# Patient Record
Sex: FEMALE | Race: BLACK OR AFRICAN AMERICAN | NOT HISPANIC OR LATINO | Employment: FULL TIME | URBAN - METROPOLITAN AREA
[De-identification: names, ages, dates, MRNs, and addresses within clinical notes are randomized per-mention and may not be internally consistent; named-entity substitution may affect disease eponyms.]

---

## 2021-09-28 ENCOUNTER — TELEPHONE (OUTPATIENT)
Dept: OPTOMETRY | Facility: CLINIC | Age: 56
End: 2021-09-28

## 2024-01-30 ENCOUNTER — OFFICE VISIT (OUTPATIENT)
Dept: PULMONOLOGY | Facility: CLINIC | Age: 59
End: 2024-01-30
Payer: OTHER GOVERNMENT

## 2024-01-30 VITALS
OXYGEN SATURATION: 97 % | DIASTOLIC BLOOD PRESSURE: 78 MMHG | HEART RATE: 102 BPM | BODY MASS INDEX: 35.97 KG/M2 | WEIGHT: 190.5 LBS | HEIGHT: 61 IN | SYSTOLIC BLOOD PRESSURE: 121 MMHG

## 2024-01-30 DIAGNOSIS — G47.33 OSA (OBSTRUCTIVE SLEEP APNEA): Primary | ICD-10-CM

## 2024-01-30 DIAGNOSIS — R06.09 DYSPNEA ON EXERTION: ICD-10-CM

## 2024-01-30 DIAGNOSIS — K21.9 GASTROESOPHAGEAL REFLUX DISEASE, UNSPECIFIED WHETHER ESOPHAGITIS PRESENT: ICD-10-CM

## 2024-01-30 PROCEDURE — 99215 OFFICE O/P EST HI 40 MIN: CPT | Mod: PBBFAC | Performed by: INTERNAL MEDICINE

## 2024-01-30 PROCEDURE — 99999 PR PBB SHADOW E&M-EST. PATIENT-LVL V: CPT | Mod: PBBFAC,,, | Performed by: INTERNAL MEDICINE

## 2024-01-30 PROCEDURE — 99204 OFFICE O/P NEW MOD 45 MIN: CPT | Mod: S$PBB,,, | Performed by: INTERNAL MEDICINE

## 2024-01-30 RX ORDER — FLUTICASONE PROPIONATE AND SALMETEROL 250; 50 UG/1; UG/1
1 POWDER RESPIRATORY (INHALATION) 2 TIMES DAILY
Qty: 60 EACH | Refills: 3 | Status: SHIPPED | OUTPATIENT
Start: 2024-01-30 | End: 2024-05-31

## 2024-01-30 NOTE — PROGRESS NOTES
Elsa Moore  was seen as a new patient for the evaluation of sob.    CHIEF COMPLAINT:  Shortness of Breath      HISTORY OF PRESENT ILLNESS: Elsa Moore is a 58 y.o. female  has a past medical history of Arthritis, Cancer, Encounter for blood transfusion, and Hypertension.  Patient with chronic ramirez x 1 block or 16 steps on stairs.  +gerd with burning sensation in epigastric region with radiation to back.  No fever/chills.  Intermittent wheezing.  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  +intermittent orthopnea and pnd.  +chronic lower extremities swelling.  No weight changes.  H/o breast cancer in 2014 with weight gain of 30 lbs.      Additional Pulmonary History:   Occupational/Environmental Exposures:  burned pit exposure.  Retire/disable.  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  History of exposures to TB:  denied   Family History of Lung Cancer:  denied   Tobacco:  never  Childhood history of Lung Disease:  denied  Chest surgery or trauma:  denied      PAST MEDICAL HISTORY:    Active Ambulatory Problems     Diagnosis Date Noted    Malignant neoplasm of breast (female), unspecified site 03/18/2015    Encounter for follow-up surveillance of breast cancer 12/09/2015    MARISOL (obstructive sleep apnea) 01/30/2024    Dyspnea on exertion 02/02/2024    Gastroesophageal reflux disease 02/02/2024     Resolved Ambulatory Problems     Diagnosis Date Noted    No Resolved Ambulatory Problems     Past Medical History:   Diagnosis Date    Arthritis     Cancer     Encounter for blood transfusion     Hypertension                 PAST SURGICAL HISTORY:    Past Surgical History:   Procedure Laterality Date    BREAST RECONSTRUCTION      BREAST SURGERY      biopsy    BREAST SURGERY      evacuation hematoma    MASTECTOMY      TUBAL LIGATION           FAMILY HISTORY:                History reviewed. No  "pertinent family history.    SOCIAL HISTORY:          Tobacco:   Social History     Tobacco Use   Smoking Status Never   Smokeless Tobacco Not on file     alcohol use:    Social History     Substance and Sexual Activity   Alcohol Use No                   ALLERGIES:    Review of patient's allergies indicates:   Allergen Reactions    Latex, natural rubber Rash       CURRENT MEDICATIONS:    Current Outpatient Medications   Medication Sig Dispense Refill    amlodipine (NORVASC) 5 MG tablet Take 5 mg by mouth once daily.      ANASTROZOLE ORAL Take by mouth.      ASCORBATE CALCIUM (VITAMIN C ORAL) Take by mouth.      FERROUS FUMARATE (IRON ORAL) Take by mouth.      hydrochlorothiazide (HYDRODIURIL) 12.5 MG Tab Take 12.5 mg by mouth once daily.      hydroxychloroquine (PLAQUENIL) 200 mg tablet Take 200 mg by mouth once daily.      multivitamin (THERAGRAN) per tablet Take 1 tablet by mouth once daily.      fluticasone-salmeterol diskus inhaler 250-50 mcg Inhale 1 puff into the lungs 2 (two) times daily. 60 each 3     No current facility-administered medications for this visit.                  REVIEW OF SYSTEMS:     Pulmonary related symptoms as per HPI.  Gen:  no weight loss, no fever, no night sweat  HEENT:  no visual changes, no sore throat, no hearing loss  CV:  per pnd   GI:  no melena, no hematochezia, no diarhea, no constipation.  :  no dysuria, no hematuria, no hesistancy, no dribbling  Neuro:  no syncope, no vertigo, no tinitus, occasional dizziness a/w rapid movement of head.    Psych:  No homocide or suicide ideation; no depression.  ptsd  Endocrine:  No heat or cold intolerance.  Sleep:  tanja and doing well with cpap  Otherwise, a balance of systems reviewed is negative.          PHYSICAL EXAM:  Vitals:    01/30/24 1329   BP: 121/78   Pulse: 102   SpO2: 97%   Weight: 86.4 kg (190 lb 7.6 oz)   Height: 5' 1" (1.549 m)   PainSc: 0-No pain     Body mass index is 35.99 kg/m².     GENERAL:  well develop; no apparent " distress  HEENT:  no nasal congestion; no discharge noted; class 4 modified mallampatti.   NECK:  supple; no palpable masses.  CARDIO: regular rate and rhythm  PULM:  clear to auscultation bilaterally; no intercostals retractions; no accessory muscle usage   ABDOMEN:  soft nontender/nondistended.  +bowel sound  EXTREMITIES no cce  NEURO:  CN II-XII intact.  5/5 motor in all extremities.  sensation grossly intact   to light touch.  PSYCH:  normal affect.  Alert and oriented x 4    LABS  Pulmonary Functions Testing Results(personally reviewed):    PFT 1/31/24 Ratio of 71%; FVC 2.19 L (90%); FEV1 1.55 L (80%); TLC 3.96 L (89%); dlco 14 (68%); +bd response  ABG (personally reviewed):  none  CXR (personally reviewed):  none  CT CHEST(personally reviewed):  1/31/24 no septal reticulation.  No honey combing.  No bronchiectasis.      ASSESSMENT/PLAN  Problem List Items Addressed This Visit       Dyspnea on exertion    Current Assessment & Plan     chronic in nature.  CT without overt parenchymal lung disease.    Suspect a component of reactive airway disease given chronic cough and wheezing that is worse with supine position.   Pft with +bd response   Will start empiric laba/ics.         Relevant Medications    fluticasone-salmeterol diskus inhaler 250-50 mcg    Other Relevant Orders    CT Chest Without Contrast (Completed)    Complete PFT with bronchodilator (Completed)    Gastroesophageal reflux disease    Overview     Daily gerd symptoms despite daily omeprazole.    Ct with small hiatal hernia  Will refer to GI         Relevant Orders    Ambulatory referral/consult to Gastroenterology    MARISOL (obstructive sleep apnea) - Primary    Overview     Diagn[facility] and using cpap nightly                Patient will No follow-ups on file.     45 minutes of total time spent on the encounter, which includes face to face time and non-face to face time preparing to see the patient (eg, review of tests), Obtaining and/or reviewing  separately obtained history, documenting clinical information in the electronic or other health record, independently interpreting results (not separately reported) and communicating results to the patient/family/caregiver, or Care coordination (not separately reported).

## 2024-01-31 ENCOUNTER — HOSPITAL ENCOUNTER (OUTPATIENT)
Dept: RADIOLOGY | Facility: HOSPITAL | Age: 59
Discharge: HOME | End: 2024-01-31
Attending: INTERNAL MEDICINE
Payer: OTHER GOVERNMENT

## 2024-01-31 ENCOUNTER — HOSPITAL ENCOUNTER (OUTPATIENT)
Dept: RESPIRATORY THERAPY | Facility: HOSPITAL | Age: 59
Discharge: HOME | End: 2024-01-31
Attending: INTERNAL MEDICINE
Payer: OTHER GOVERNMENT

## 2024-01-31 VITALS — RESPIRATION RATE: 20 BRPM | OXYGEN SATURATION: 98 % | HEART RATE: 86 BPM

## 2024-01-31 DIAGNOSIS — R06.09 DYSPNEA ON EXERTION: ICD-10-CM

## 2024-01-31 LAB
BRPFT: NORMAL
DLCO ADJ PRE: 14.12 ML/(MIN*MMHG)
DLCO SINGLE BREATH LLN: 15.17
DLCO SINGLE BREATH PRE REF: 67.6 %
DLCO SINGLE BREATH REF: 20.9
DLCOC SBVA LLN: 3.04
DLCOC SBVA PRE REF: 93.4 %
DLCOC SBVA REF: 4.71
DLCOC SINGLE BREATH LLN: 15.17
DLCOC SINGLE BREATH PRE REF: 67.6 %
DLCOC SINGLE BREATH REF: 20.9
DLCOVA LLN: 3.04
DLCOVA PRE REF: 93.4 %
DLCOVA PRE: 4.4 ML/(MIN*MMHG*L)
DLCOVA REF: 4.71
DLVAADJ PRE: 4.4 ML/(MIN*MMHG*L)
ERVN2 LLN: -16449.23
ERVN2 PRE REF: 44.4 %
ERVN2 PRE: 0.34 L
ERVN2 REF: 0.77
FEF 25 75 CHG: 30.1 %
FEF 25 75 LLN: 0.8
FEF 25 75 POST REF: 86.5 %
FEF 25 75 PRE REF: 66.5 %
FEF 25 75 REF: 1.86
FET100 CHG: 6 %
FEV1 CHG: 18.9 %
FEV1 FVC CHG: 7.8 %
FEV1 FVC LLN: 68
FEV1 FVC POST REF: 95.6 %
FEV1 FVC PRE REF: 88.7 %
FEV1 FVC REF: 80
FEV1 LLN: 1.41
FEV1 POST REF: 95.3 %
FEV1 PRE REF: 80.2 %
FEV1 REF: 1.94
FRCN2 LLN: 1.71
FRCN2 PRE REF: 73.1 %
FRCN2 REF: 2.53
FVC CHG: 10.3 %
FVC LLN: 1.79
FVC POST REF: 99.4 %
FVC PRE REF: 90.1 %
FVC REF: 2.43
IVC PRE: 1.92 L
IVC SINGLE BREATH LLN: 1.79
IVC SINGLE BREATH PRE REF: 79.1 %
IVC SINGLE BREATH REF: 2.43
PEF CHG: 59.1 %
PEF LLN: 3.31
PEF POST REF: 83.3 %
PEF PRE REF: 52.3 %
PEF REF: 5.13
POST FEF 25 75: 1.61 L/S
POST FET 100: 8.34 SEC
POST FEV1 FVC: 76.62 %
POST FEV1: 1.85 L
POST FVC: 2.41 L
POST PEF: 4.27 L/S
PRE DLCO: 14.12 ML/(MIN*MMHG)
PRE FEF 25 75: 1.24 L/S
PRE FET 100: 7.87 SEC
PRE FEV1 FVC: 71.09 %
PRE FEV1: 1.55 L
PRE FRC N2: 1.85 L
PRE FVC: 2.19 L
PRE PEF: 2.69 L/S
RVN2 LLN: 1.19
RVN2 PRE REF: 85.5 %
RVN2 PRE: 1.51 L
RVN2 REF: 1.77
RVN2TLCN2 LLN: 29.77
RVN2TLCN2 PRE REF: 96.9 %
RVN2TLCN2 PRE: 38.13 %
RVN2TLCN2 REF: 39.36
TLCN2 LLN: 3.45
TLCN2 PRE REF: 89.2 %
TLCN2 PRE: 3.96 L
TLCN2 REF: 4.44
VA PRE: 3.21 L
VA SINGLE BREATH LLN: 4.29
VA SINGLE BREATH PRE REF: 74.9 %
VA SINGLE BREATH REF: 4.29
VCMAXN2 LLN: 1.79
VCMAXN2 PRE REF: 101 %
VCMAXN2 PRE: 2.45 L
VCMAXN2 REF: 2.43

## 2024-01-31 PROCEDURE — 94727 GAS DIL/WSHOT DETER LNG VOL: CPT

## 2024-01-31 PROCEDURE — 25000242 PHARM REV CODE 250 ALT 637 W/ HCPCS: Performed by: INTERNAL MEDICINE

## 2024-01-31 PROCEDURE — 71250 CT THORAX DX C-: CPT | Mod: 26,,, | Performed by: RADIOLOGY

## 2024-01-31 PROCEDURE — 71250 CT THORAX DX C-: CPT | Mod: TC

## 2024-01-31 PROCEDURE — 94729 DIFFUSING CAPACITY: CPT | Mod: 26,,, | Performed by: INTERNAL MEDICINE

## 2024-01-31 PROCEDURE — 94727 GAS DIL/WSHOT DETER LNG VOL: CPT | Mod: 26,,, | Performed by: INTERNAL MEDICINE

## 2024-01-31 PROCEDURE — 94060 EVALUATION OF WHEEZING: CPT | Mod: 26,,, | Performed by: INTERNAL MEDICINE

## 2024-01-31 PROCEDURE — 94729 DIFFUSING CAPACITY: CPT

## 2024-01-31 RX ORDER — ALBUTEROL SULFATE 2.5 MG/.5ML
2.5 SOLUTION RESPIRATORY (INHALATION) ONCE
Status: COMPLETED | OUTPATIENT
Start: 2024-01-31 | End: 2024-01-31

## 2024-01-31 RX ADMIN — ALBUTEROL SULFATE 2.5 MG: 2.5 SOLUTION RESPIRATORY (INHALATION) at 10:01

## 2024-02-02 PROBLEM — K21.9 GASTROESOPHAGEAL REFLUX DISEASE: Status: ACTIVE | Noted: 2024-02-02

## 2024-02-02 PROBLEM — R06.09 DYSPNEA ON EXERTION: Status: ACTIVE | Noted: 2024-02-02

## 2024-02-02 NOTE — ASSESSMENT & PLAN NOTE
chronic in nature.  CT without overt parenchymal lung disease.    Suspect a component of reactive airway disease given chronic cough and wheezing that is worse with supine position.   Pft with +bd response   Will start empiric laba/ics.

## 2024-02-05 ENCOUNTER — TELEPHONE (OUTPATIENT)
Dept: PULMONOLOGY | Facility: CLINIC | Age: 59
End: 2024-02-05
Payer: OTHER GOVERNMENT

## 2024-02-05 NOTE — TELEPHONE ENCOUNTER
Noted.            ----- Message from Tal Mcdaniel MD sent at 2/2/2024  9:31 AM CST -----  Result of ct and pft d/w patient. There is evidence of hiatal hernia which can cause reflux.  In addition, pft with obstructive physiology c/w asthma.  Advise patient to continue with ppi, laba-ics and GI follow up.  [facility] will [facility]

## 2024-05-24 DIAGNOSIS — M25.552 PAIN IN LEFT HIP: Primary | ICD-10-CM

## 2024-05-31 DIAGNOSIS — R06.09 DYSPNEA ON EXERTION: ICD-10-CM

## 2024-05-31 RX ORDER — FLUTICASONE PROPIONATE AND SALMETEROL 250; 50 UG/1; UG/1
1 POWDER RESPIRATORY (INHALATION) 2 TIMES DAILY
Qty: 60 EACH | Refills: 3 | Status: SHIPPED | OUTPATIENT
Start: 2024-05-31

## 2024-06-04 PROBLEM — M25.552 LEFT HIP PAIN: Status: ACTIVE | Noted: 2024-06-04
